# Patient Record
Sex: MALE | Race: WHITE | Employment: OTHER | URBAN - METROPOLITAN AREA
[De-identification: names, ages, dates, MRNs, and addresses within clinical notes are randomized per-mention and may not be internally consistent; named-entity substitution may affect disease eponyms.]

---

## 2017-07-05 ENCOUNTER — OFFICE VISIT (OUTPATIENT)
Dept: ORTHOPEDIC SURGERY | Age: 67
End: 2017-07-05

## 2017-07-05 VITALS
TEMPERATURE: 97.9 F | HEIGHT: 70 IN | WEIGHT: 234.4 LBS | SYSTOLIC BLOOD PRESSURE: 136 MMHG | BODY MASS INDEX: 33.56 KG/M2 | HEART RATE: 81 BPM | DIASTOLIC BLOOD PRESSURE: 66 MMHG

## 2017-07-05 DIAGNOSIS — E11.610 CHARCOT FOOT DUE TO DIABETES MELLITUS (HCC): Primary | ICD-10-CM

## 2017-07-05 DIAGNOSIS — M25.571 RIGHT ANKLE PAIN, UNSPECIFIED CHRONICITY: ICD-10-CM

## 2017-07-05 DIAGNOSIS — M79.671 RIGHT FOOT PAIN: ICD-10-CM

## 2017-07-05 RX ORDER — FUROSEMIDE 20 MG/1
TABLET ORAL DAILY
Status: ON HOLD | COMMUNITY
End: 2019-07-27

## 2017-07-05 RX ORDER — OMEGA-3-ACID ETHYL ESTERS 1 G/1
2 CAPSULE, LIQUID FILLED ORAL 2 TIMES DAILY
Status: ON HOLD | COMMUNITY
End: 2020-06-02 | Stop reason: SDUPTHER

## 2017-07-05 RX ORDER — SPIRONOLACTONE 25 MG/1
TABLET ORAL DAILY
Status: ON HOLD | COMMUNITY
End: 2019-07-27

## 2017-07-05 RX ORDER — CLOPIDOGREL BISULFATE 75 MG/1
TABLET ORAL
Status: ON HOLD | COMMUNITY
End: 2019-07-27

## 2017-07-05 RX ORDER — ATORVASTATIN CALCIUM 80 MG/1
80 TABLET, FILM COATED ORAL DAILY
Status: ON HOLD | COMMUNITY
End: 2020-06-02 | Stop reason: SDUPTHER

## 2017-07-05 RX ORDER — GLYBURIDE 5 MG/1
5 TABLET ORAL 2 TIMES DAILY WITH MEALS
Status: ON HOLD | COMMUNITY
End: 2020-06-02 | Stop reason: SDUPTHER

## 2017-07-05 RX ORDER — INSULIN GLARGINE 100 [IU]/ML
55 INJECTION, SOLUTION SUBCUTANEOUS
COMMUNITY
End: 2019-07-26

## 2017-07-05 RX ORDER — LISINOPRIL 10 MG/1
40 TABLET ORAL DAILY
Status: ON HOLD | COMMUNITY
Start: 2019-07-26 | End: 2019-07-27 | Stop reason: SDUPTHER

## 2017-07-05 RX ORDER — NORTRIPTYLINE HYDROCHLORIDE 25 MG/1
25 CAPSULE ORAL
COMMUNITY
End: 2019-10-10

## 2017-07-05 RX ORDER — CARVEDILOL PHOSPHATE 80 MG/1
80 CAPSULE, EXTENDED RELEASE ORAL
Status: ON HOLD | COMMUNITY
End: 2020-06-02 | Stop reason: SDUPTHER

## 2017-07-05 NOTE — MR AVS SNAPSHOT
Visit Information Date & Time Provider Department Dept. Phone Encounter #  
 7/5/2017  2:30 PM Nathan Secarlos, 29 [facility] [phone] [id number] Upcoming Health Maintenance Date Due Hepatitis C Screening 1950 DTaP/Tdap/Td series (1 - Tdap) 12/1/1973 FOBT Q 1 YEAR AGE 50-75 12/1/2002 ZOSTER VACCINE AGE 60> 12/1/2012 INFLUENZA AGE 9 TO ADULT 8/1/2017 Allergies as of 7/5/2017  Review Complete On: 7/5/2017 By: Giles Carney No Known Allergies Current Immunizations  Never Reviewed No immunizations on file. Not reviewed this visit You Were Diagnosed With   
  
 Codes Comments Charcot foot due to diabetes mellitus ([facility]    -  Primary ICD-10-CM: E11.610 ICD-9-CM: 250.60, 713.5 Right ankle pain, unspecified chronicity     ICD-10-CM: M25.571 ICD-9-CM: 719.47 Right foot pain     ICD-10-CM: M79.671 ICD-9-CM: 729.5 Vitals BP Pulse Temp Height(growth percentile) Weight(growth percentile) BMI  
 136/66 81 97.9 °F (36.6 °C) (Oral) 5' 10\" (1.778 m) 234 lb 6.4 oz (106.3 kg) 33.63 kg/m2 Smoking Status Former Smoker BMI and BSA Data Body Mass Index Body Surface Area  
 33.63 kg/m 2 2.29 m 2 Your Updated Medication List  
  
   
This list is accurate as of: 7/5/17  3:52 PM.  Always use your most recent med list. ALDACTONE 25 mg tablet Generic drug:  spironolactone Take  by mouth daily. atorvastatin 80 mg tablet Commonly known as:  LIPITOR Take 80 mg by mouth daily. COREG CR 80 mg CR capsule Generic drug:  carvedilol Take  by mouth daily (with breakfast). furosemide 20 mg tablet Commonly known as:  LASIX Take  by mouth daily. glyBURIDE 5 mg tablet Commonly known as:  Shade Agustina Take  by mouth Daily (before breakfast). JANUMET 50-1,000 mg per tablet Generic drug:  SITagliptin-metFORMIN  
 Take 1 Tab by mouth two (2) times daily (with meals). L-Mfolate-B6 Phos-Methyl-B12 3-35-2 mg Tab tab Commonly known as:  Allison Hatton Take 1 Tab by mouth daily. LANTUS 100 unit/mL injection Generic drug:  insulin glargine 55 Units by SubCUTAneous route nightly. lisinopril 10 mg tablet Commonly known as:  Jaxon Little Take  by mouth daily. LOVAZA 1 gram capsule Generic drug:  omega-3 acid ethyl esters Take 2 g by mouth two (2) times a day. nortriptyline 25 mg capsule Commonly known as:  PAMELOR Take  by mouth nightly. PLAVIX 75 mg Tab Generic drug:  clopidogrel Take  by mouth. We Performed the Following AMB POC XRAY, FOOT; COMPLETE, 3+ VIEW [15984 CPT(R)] POC XRAY, ANKLE; 2 VIEWS [33207 CPT(R)] Patient Instructions Please follow up in 3 weeks. You are advised to contact us if your condition worsens. The provider has ordered a custom brace/orthotic for you. This will be customized and made for you by an outside facility. Please contact the orthotist at one of the below offices for your custom fitting and follow up in the office with the doctor or his physicians assistant 3 weeks after you have been wearing the brace. Cathy Jenkins at [facility]:  
 
[address] [place] [address] Phone: [phone] Charcot Foot: Care Instructions Your Care Instructions Charcot foot (pronounced \"danielle-KO\") is a problem that can happen in people who have nerve damage from diabetes. In rare cases, it is caused by other health problems. Nerve damage in the foot or ankle leads to numbness, pain, redness, and swelling. The sore foot may feel hotter than the other foot. This increases the chance that you could break bones in your foot or have another injury and not feel it. If your foot gets injured a lot, the joints can break down, and the foot can become deformed.  If you have severe Charcot foot, you may not be able to walk normally. The problem also increases the risk of foot ulcers and infections. You can protect your feet as much as possible to keep them from being injured. If you already have Charcot foot, you may wear a castor a series of castsfor several months to help your foot heal. 
Follow-up care is a key part of your treatment and safety. Be sure to make and go to all appointments, and call your doctor if you are having problems. It's also a good idea to know your test results and keep a list of the medicines you take. How can you care for yourself at home? · Follow your doctor's directions if you are told to wear a plaster or fiberglass cast. The cast can help ease pressure and prevent further damage to your foot. · Keep your blood sugar in your target range by eating healthy foods, getting regular exercise, and taking insulin or other medicine as prescribed. Check your blood sugar as often as your doctor recommends. Taking care of your feet · Inspect your feet daily for blisters, cuts, cracks, or sores. If you cannot see well, use a mirror or have someone help you. · Take care of your feet: 
[facility] your feet every day. Use warm (not hot) water. Check the water temperature with your wrists, not your feet. ¨ Dry your feet well. Pat them dry; do not rub the skin on your feet too hard. Dry well between your toes. If the skin on your feet stays moist, bacteria or a fungus can grow, which can lead to infection. ¨ Keep your skin soft. Use skin cream to prevent calluses and cracks. However, do not put the cream between your toes, and stop using any cream that causes a rash. ¨ Clean underneath your toenails carefully. Do not use a sharp object to clean underneath your toenails. Use the blunt end of a nail file or other rounded tool. ¨ Trim and file your toenails straight across to prevent ingrown toenails. Use a nail clipper, not scissors. Use an emery board to smooth the edges. · Change socks daily. Socks should be thick and cushioned and fit loosely around your feet. Socks without seams are best because seams often rub the feet. Do not wear stockings, socks, or garters that come up to the thigh or knee unless your doctor tells you to. These can decrease blood flow. You can find socks from specialty catalogs for people with diabetes who have problem feet. · Look inside your shoes every day for things such as gravel or torn linings, which could cause blisters or sores. · Buy shoes that fit well. Shop for shoes in the evening when your feet are more likely to be swollen. Look for shoes that have plenty of space around the toes. This helps prevent bunions and blisters. Try on shoes with the kind of socks you will usually wear with those shoes. Break in new shoes slowly by wearing them for no more than an hour a day for several days. Avoid plastic shoes. They may rub your feet and cause blisters. Good shoes should be made of materials that are flexible and breathable, such as leather or cloth. · Do not go barefoot. Do not wear sandals, and do not wear shoes with very thin soles. Thin soles are easy to puncture. They also do not protect your feet from hot pavement or cold weather. · Have your doctor check your feet during each visit. If you have a foot problem, see your doctor. Do not try to treat an early foot problem at home. Home remedies or treatments that you can buy without a prescription (such as corn removers) can be harmful. · Always get early treatment for foot problems. A minor irritation can lead to a major problem if not properly cared for early. · Do not smoke. Smoking affects blood flow and can make foot problems worse. If you need help quitting, talk to your doctor about stop-smoking programs and medicines. These can increase your chances of quitting for good. When should you call for help? Call your doctor now or seek immediate medical care if: 
· You have symptoms of infection, such as: 
¨ Increased pain, swelling, warmth, or redness. ¨ Red streaks leading from the area. ¨ Pus draining from the area. ¨ A fever. Watch closely for changes in your health, and be sure to contact your doctor if: 
· You have a new problem with your feet, such as: ¨ A new sore or ulcer. ¨ A break in the skin that is not healing after several days. ¨ Bleeding corns or calluses. ¨ An ingrown toenail. · You do not get better as expected. Where can you learn more? Go to http://inez-iván.info/. Enter C247 in the search box to learn more about \"Charcot Foot: Care Instructions. \" Current as of: March 13, 2017 Content Version: 11.3 © 6183-7527 [employer]. Care instructions adapted under license by [employer] (which disclaims liability or warranty for this information). If you have questions about a medical condition or this instruction, always ask your healthcare professional. [address] any warranty or liability for your use of this information. Introducing [facility]! Abel Castaneda introduces [employer] patient portal. Now you can access parts of your medical record, email your doctor's office, and request medication refills online. 1. In your internet browser, go to [email or web address] 2. Click on the First Time User? Click Here link in the Sign In box. You will see the New Member Sign Up page. 3. Enter your [employer] Access Code exactly as it appears below. You will not need to use this code after youve completed the sign-up process. If you do not sign up before the expiration date, you must request a new code. · [employer] Access Code: [email or web address] Expires: 10/3/2017  3:52 PM 
 
4.  Enter the last four digits of your Social Security Number (xxxx) and Date of Birth (mm/dd/yyyy) as indicated and click Submit. You will be taken to the next sign-up page. 5. Create a [employer] ID. This will be your [employer] login ID and cannot be changed, so think of one that is secure and easy to remember. 6. Create a [employer] password. You can change your password at any time. 7. Enter your Password Reset Question and Answer. This can be used at a later time if you forget your password. 8. Enter your e-mail address. You will receive e-mail notification when new information is available in [address] 9. Click Sign Up. You can now view and download portions of your medical record. 10. Click the Download Summary menu link to download a portable copy of your medical information. If you have questions, please visit the Frequently Asked Questions section of the [employer] website. Remember, [employer] is NOT to be used for urgent needs. For medical emergencies, dial 911. Now available from your iPhone and Android! Please provide this summary of care documentation to your next provider. Your primary care clinician is listed as Phys Other. If you have any questions after today's visit, please call [phone].

## 2017-07-05 NOTE — PROCEDURES
DIAGNOSTIC STUDIES:  X-rays of the right ankle, three views, AP, lateral, and oblique: The ankle joint is fairly well maintained. There is some spurring to the very distal tip of the fibula. There is some soft tissue swelling seen medially and laterally, but the ankle joint is still preserved. There is no dislocation or subluxation of the ankle joint. Lateral foot and ankle, as well as AP and oblique of the foot: These films reveal severe Charcot neuroarthropathy to the right midfoot, stage one, in the coalescent phase. There is severe, destructive changes to the navicular cuneiform to the midfoot TMT joints, two, three, four, and there is abduction or varus to the forefoot, severe. There are some destructive changes seen to the number four and five TMT region (type I severe Charcot neuroarthropathy of the foot without any ulcerations or wounds visualized on these x-rays).

## 2017-07-05 NOTE — PATIENT INSTRUCTIONS
Please follow up in 3 weeks. You are advised to contact us if your condition worsens. The provider has ordered a custom brace/orthotic for you. This will be customized and made for you by an outside facility. Please contact the orthotist at one of the below offices for your custom fitting and follow up in the office with the doctor or his physicians assistant 3 weeks after you have been wearing the brace. Martinlettchandra Estimable at [facility]:     [facility] [place] [address]   Phone: [phone]    Charcot Foot: Care Instructions  Your Care Instructions    Charcot foot (pronounced \"danielle-KO\") is a problem that can happen in people who have nerve damage from diabetes. In rare cases, it is caused by other health problems. Nerve damage in the foot or ankle leads to numbness, pain, redness, and swelling. The sore foot may feel hotter than the other foot. This increases the chance that you could break bones in your foot or have another injury and not feel it. If your foot gets injured a lot, the joints can break down, and the foot can become deformed. If you have severe Charcot foot, you may not be able to walk normally. The problem also increases the risk of foot ulcers and infections. You can protect your feet as much as possible to keep them from being injured. If you already have Charcot foot, you may wear a cast--or a series of casts--for several months to help your foot heal.  Follow-up care is a key part of your treatment and safety. Be sure to make and go to all appointments, and call your doctor if you are having problems. It's also a good idea to know your test results and keep a list of the medicines you take. How can you care for yourself at home? · Follow your doctor's directions if you are told to wear a plaster or fiberglass cast. The cast can help ease pressure and prevent further damage to your foot.   · Keep your blood sugar in your target range by eating healthy foods, getting regular exercise, and taking insulin or other medicine as prescribed. Check your blood sugar as often as your doctor recommends. Taking care of your feet  · Inspect your feet daily for blisters, cuts, cracks, or sores. If you cannot see well, use a mirror or have someone help you. · Take care of your feet:  [facility] your feet every day. Use warm (not hot) water. Check the water temperature with your wrists, not your feet. ¨ Dry your feet well. Pat them dry; do not rub the skin on your feet too hard. Dry well between your toes. If the skin on your feet stays moist, bacteria or a fungus can grow, which can lead to infection. ¨ Keep your skin soft. Use skin cream to prevent calluses and cracks. However, do not put the cream between your toes, and stop using any cream that causes a rash. ¨ Clean underneath your toenails carefully. Do not use a sharp object to clean underneath your toenails. Use the blunt end of a nail file or other rounded tool. ¨ Trim and file your toenails straight across to prevent ingrown toenails. Use a nail clipper, not scissors. Use an emery board to smooth the edges. · Change socks daily. Socks should be thick and cushioned and fit loosely around your feet. Socks without seams are best because seams often rub the feet. Do not wear stockings, socks, or garters that come up to the thigh or knee unless your doctor tells you to. These can decrease blood flow. You can find socks from specialty catalogs for people with diabetes who have problem feet. · Look inside your shoes every day for things such as gravel or torn linings, which could cause blisters or sores. · Buy shoes that fit well. Shop for shoes in the evening when your feet are more likely to be swollen. Look for shoes that have plenty of space around the toes. This helps prevent bunions and blisters. Try on shoes with the kind of socks you will usually wear with those shoes.  Break in new shoes slowly by wearing them for no more than an hour a day for several days. Avoid plastic shoes. They may rub your feet and cause blisters. Good shoes should be made of materials that are flexible and breathable, such as leather or cloth. · Do not go barefoot. Do not wear sandals, and do not wear shoes with very thin soles. Thin soles are easy to puncture. They also do not protect your feet from hot pavement or cold weather. · Have your doctor check your feet during each visit. If you have a foot problem, see your doctor. Do not try to treat an early foot problem at home. Home remedies or treatments that you can buy without a prescription (such as corn removers) can be harmful. · Always get early treatment for foot problems. A minor irritation can lead to a major problem if not properly cared for early. · Do not smoke. Smoking affects blood flow and can make foot problems worse. If you need help quitting, talk to your doctor about stop-smoking programs and medicines. These can increase your chances of quitting for good. When should you call for help? Call your doctor now or seek immediate medical care if:  · You have symptoms of infection, such as:  ¨ Increased pain, swelling, warmth, or redness. ¨ Red streaks leading from the area. ¨ Pus draining from the area. ¨ A fever. Watch closely for changes in your health, and be sure to contact your doctor if:  · You have a new problem with your feet, such as:  ¨ A new sore or ulcer. ¨ A break in the skin that is not healing after several days. ¨ Bleeding corns or calluses. ¨ An ingrown toenail. · You do not get better as expected. Where can you learn more? Go to http://inez-iván.info/. Enter C247 in the search box to learn more about \"Charcot Foot: Care Instructions. \"  Current as of: March 13, 2017  Content Version: 11.3  © 9118-2500 [employer], [employer]  Care instructions adapted under license by [employer] (which disclaims liability or warranty for this information). If you have questions about a medical condition or this instruction, always ask your healthcare professional. [place] [address] any warranty or liability for your use of this information.

## 2017-07-05 NOTE — PROGRESS NOTES
AMBULATORY PROGRESS NOTE      Patient: Denny Franklin             MRN: [id number]     SSN: [id number] Body mass index is 33.63 kg/(m^2). YOB: 1950     AGE: 61 y.o. EX: male    PCP: Vy Chowdhury MD    IMPRESSION/DIAGNOSIS AND TREATMENT PLAN     DIAGNOSES  1. Charcot foot due to diabetes mellitus ([address]    2. Right ankle pain, unspecified chronicity    3. Right foot pain        Orders Placed This Encounter    AMB SUPPLY ORDER    [20995] Foot Min 3V    [87538] Ankle 2V      Denny Franklin understands his diagnoses and the proposed plan. I had a lengthy discussion with this individual.  He has severe Charcot neuroarthropathy secondary to diabetes to his right midfoot. He is in the healed phase. He had seen Dr. Leny Ochoa recently and saw Dr. Ambar Wallace at [facility].      After a lengthy discussion with this individual, treatment options would include nonoperative and/or operative. The nonoperative treatment would consist of a custom CROW CAM walker-type, rocker bottom-type boot (CROW boot). He does not have any wounds on this foot at this point in time. He does have a slight, slight fullness to the plantar lateral part of his foot, which I believe, is corresponding to the cuboid and the fifth metatarsal region. The skin is intact. It is not hot, not red, and not erythematous. There are no calluses at all to his foot at this current time. His pulses are 1+ dorsalis pedis and 1+ posterior tibialis. So, I am going to try hard for nonoperative treatment. We will see if we can get him in a CROW walker boot for at least six months and then hopefully get him into a hinged-type AFO brace. Otherwise, operative treatment would consist of an extensive osteotomy of his midfoot, extensive surgery, Achilles tendon lengthening or a gastrocs release. Certainly, in an individual who has diabetes, he is at high risk for infection.   He voices understanding of my concerns and understands the rationale for nonoperative treatment at this current time. Additional plans are listed as below. Plan:    1) Custom right CROW walker boot  2) Continue activity as tolerated  3) Recommend wearing shoes at all times    RTO - 3 weeks    HPI AND EXAMINATION     Harish Tamayo IS A 61 y.o. male who presents to my outpatient office complaining of: right foot pain. Patient is currently wearing a right tall CAM boot. Patient endorses reduced sensation within his right foot, however he reports that a lot of his sensation returned when he quit smoking in 1/2017. He states that he has significant discomfort within his right foot. Patient notes that since he has been wearing the right tall CAM boot his swelling and coloration has improved significantly. Patient was referred by Dr. Alejandra De Santiago. He reports he was also seen by Dr. Latasha Metz, but he states that he will not be returning to him. Patient h/o DM with neuropathy. Harish Tamayo is alert/oriented (name, location, time) and follows commands well. he  is in no acute distress and his affect and mood are appropriate. Right ANKLE and FOOT    Gait: slow  Tenderness: moderate tenderness within his right foot. Cutaneous: No rashes, skin patches, wounds, or abrasions to the lower legs           Warm and Normal color. No regions of expressible drainage. Medial Border of Tibia Region: absent           Skin color, texture, turgor normal. Normal.  Joint Motion:     Ankle: functional ROM, some limited downward motion, poor eversion and inversion. Forefoot: Decreased ROM, unable to move second, third, fourth, and fifth toes. Neurologic Exam: Neuro: Motor: no muscle wasting or atrophy and Sensory : reduced light touch sensation, location: toes. Contractures: Gastrocnemius or Achilles Contractures absent  Joint / Tendon Stability: No Ankle or Subtalar instability or joint laxity.                        No peroneal sublux ability or dislocation  Alignment:  Normal Foot Alignment and Semi Rigid. MIDFOOT DEFORMITY WITH ADDUCTION/VARUS OF MIDFOOT  Vascular: Normal Pulses/ NL Capillary refill, No evidence of DVT seen on physical exam.   No calf swelling, no tenderness to calf muscles. Lymphatic:  No Evidence of Lymphedema. CHART REVIEW     Past Medical History:   Diagnosis Date    Diabetes [facility])      Current Outpatient Prescriptions   Medication Sig    lisinopril (PRINIVIL, ZESTRIL) 10 mg tablet Take  by mouth daily.  spironolactone (ALDACTONE) 25 mg tablet Take  by mouth daily.  furosemide (LASIX) 20 mg tablet Take  by mouth daily.  carvedilol (COREG CR) 80 mg CR capsule Take  by mouth daily (with breakfast).  atorvastatin (LIPITOR) 80 mg tablet Take 80 mg by mouth daily.  clopidogrel (PLAVIX) 75 mg tab Take  by mouth.  glyBURIDE (DIABETA) 5 mg tablet Take  by mouth Daily (before breakfast).  omega-3 acid ethyl esters (LOVAZA) 1 gram capsule Take 2 g by mouth two (2) times a day.  L-Mfolate-B6 Phos-Methyl-B12 (METANX) 3-35-2 mg tab tab Take 1 Tab by mouth daily.  insulin glargine (LANTUS) 100 unit/mL injection 55 Units by SubCUTAneous route nightly.  SITagliptin-metFORMIN (JANUMET) 50-1,000 mg per tablet Take 1 Tab by mouth two (2) times daily (with meals).  nortriptyline (PAMELOR) 25 mg capsule Take  by mouth nightly. No current facility-administered medications for this visit. No Known Allergies  History reviewed. No pertinent surgical history. Social History     Occupational History    Not on file. Social History Main Topics    Smoking status: Former Smoker    Smokeless tobacco: Never Used    Alcohol use No    Drug use: No    Sexual activity: Not on file     History reviewed. No pertinent family history. REVIEW OF SYSTEMS : 7/5/2017  ALL BELOW ARE Negative except : SEE HPI       Constitutional: Negative for fever, chills and weight loss.  Neg Weigh Loss  Cardiovascular: Negative for chest pain, claudication and leg swelling. SOB, MAY   Gastrointestinal: Negative for  pain, N/V/D/C, Blood in stool or urine,dysuria, hematuria,        Incontinence, pelvic pain  Musculoskeletal: see HPI. Neurological: Negative for dizziness and weakness. Negative for headaches,Visual Changes, Confusion, Seizures,   Psychiatric/Behavioral: Negative for depression, memory loss and substance abuse. Extremities:  Negative for  hair changes, rash or skin lesion changes. Hematologic: Negative for Bleeding problems, bruising, pallor or swollen lymph nodes. Peripheral Vascular: No calf pain, vascular vein tenderness to calf pain              No calf throbbing, posterior knee throbbing pain    DIAGNOSTIC IMAGING      Dictation on: 07/05/2017  6:18 PM by: Myranda Burt [[id number]]       Written by Kezia Davidson, as dictated by Humphrey Recinos MD. IDr., Humphrey Recinos MD, confirm that all documentation is accurate.

## 2019-07-26 PROBLEM — R31.9 HEMATURIA: Status: ACTIVE | Noted: 2019-07-26

## 2019-08-15 PROBLEM — R33.9 URINARY RETENTION: Status: ACTIVE | Noted: 2019-08-15

## 2020-05-12 PROBLEM — A41.9 SEPSIS (HCC): Status: ACTIVE | Noted: 2020-05-12

## 2020-05-12 PROBLEM — I96 GANGRENE (HCC): Status: ACTIVE | Noted: 2020-05-12

## 2020-05-12 PROBLEM — M86.9 OSTEOMYELITIS (HCC): Status: ACTIVE | Noted: 2020-05-12

## 2020-05-12 PROBLEM — L08.9 DIABETIC FOOT INFECTION (HCC): Status: ACTIVE | Noted: 2020-05-12

## 2020-05-12 PROBLEM — E11.628 DIABETIC FOOT INFECTION (HCC): Status: ACTIVE | Noted: 2020-05-12

## 2020-05-26 PROBLEM — I50.32 DIASTOLIC CHF, CHRONIC (HCC): Status: ACTIVE | Noted: 2020-05-26

## 2020-05-26 PROBLEM — I96 GANGRENE (HCC): Status: RESOLVED | Noted: 2020-05-12 | Resolved: 2020-05-26

## 2020-05-26 PROBLEM — A41.9 SEPSIS (HCC): Status: RESOLVED | Noted: 2020-05-12 | Resolved: 2020-05-26

## 2020-05-26 PROBLEM — R21 RASH: Status: ACTIVE | Noted: 2020-05-26

## 2020-05-26 PROBLEM — E11.9 TYPE 2 DIABETES MELLITUS, WITH LONG-TERM CURRENT USE OF INSULIN (HCC): Status: ACTIVE | Noted: 2020-05-26

## 2020-05-26 PROBLEM — N17.9 AKI (ACUTE KIDNEY INJURY) (HCC): Status: RESOLVED | Noted: 2020-05-26 | Resolved: 2020-05-26

## 2020-05-26 PROBLEM — M86.9 OSTEOMYELITIS (HCC): Status: RESOLVED | Noted: 2020-05-12 | Resolved: 2020-05-26

## 2020-05-26 PROBLEM — Z89.512 S/P BKA (BELOW KNEE AMPUTATION) UNILATERAL, LEFT (HCC): Status: ACTIVE | Noted: 2020-05-26

## 2020-05-26 PROBLEM — L08.9 DIABETIC FOOT INFECTION (HCC): Status: RESOLVED | Noted: 2020-05-12 | Resolved: 2020-05-26

## 2020-05-26 PROBLEM — E11.628 DIABETIC FOOT INFECTION (HCC): Status: RESOLVED | Noted: 2020-05-12 | Resolved: 2020-05-26

## 2020-05-26 PROBLEM — N17.9 AKI (ACUTE KIDNEY INJURY) (HCC): Status: ACTIVE | Noted: 2020-05-26

## 2020-05-26 PROBLEM — Z79.4 TYPE 2 DIABETES MELLITUS, WITH LONG-TERM CURRENT USE OF INSULIN (HCC): Status: ACTIVE | Noted: 2020-05-26

## 2020-05-28 PROBLEM — I95.9 HYPOTENSION: Status: ACTIVE | Noted: 2020-05-28

## 2021-02-18 PROBLEM — H91.90 HEARING LOSS: Status: ACTIVE | Noted: 2020-06-11

## 2021-02-18 PROBLEM — I25.10 CORONARY ARTERIOSCLEROSIS: Status: ACTIVE | Noted: 2020-08-13

## 2022-06-29 PROBLEM — J90 PLEURAL EFFUSION ON RIGHT: Status: ACTIVE | Noted: 2022-01-01

## 2022-06-29 PROBLEM — E87.5 HYPERKALEMIA: Status: ACTIVE | Noted: 2022-01-01

## 2022-06-29 PROBLEM — R91.8 LUNG MASS: Status: ACTIVE | Noted: 2022-01-01

## 2022-06-29 PROBLEM — R07.9 ACUTE CHEST PAIN: Status: ACTIVE | Noted: 2022-01-01

## 2022-06-29 PROBLEM — R06.09 EXERTIONAL DYSPNEA: Status: ACTIVE | Noted: 2022-01-01

## 2022-07-05 PROBLEM — R59.0 MEDIASTINAL ADENOPATHY: Status: ACTIVE | Noted: 2022-01-01
